# Patient Record
Sex: FEMALE | Race: ASIAN | NOT HISPANIC OR LATINO | URBAN - METROPOLITAN AREA
[De-identification: names, ages, dates, MRNs, and addresses within clinical notes are randomized per-mention and may not be internally consistent; named-entity substitution may affect disease eponyms.]

---

## 2020-07-17 ENCOUNTER — OFFICE VISIT (OUTPATIENT)
Dept: URBAN - METROPOLITAN AREA CLINIC 115 | Facility: CLINIC | Age: 65
End: 2020-07-17
Payer: MEDICARE

## 2020-07-17 DIAGNOSIS — F41.9 ANXIETY: ICD-10-CM

## 2020-07-17 DIAGNOSIS — K21.9 GERD: ICD-10-CM

## 2020-07-17 DIAGNOSIS — K29.70 GASTRITIS: ICD-10-CM

## 2020-07-17 DIAGNOSIS — K58.9 IBS (IRRITABLE BOWEL SYNDROME)-DIARRHEA: ICD-10-CM

## 2020-07-17 PROBLEM — 4556007 GASTRITIS: Status: ACTIVE | Noted: 2020-07-17

## 2020-07-17 PROBLEM — 235595009 GASTROESOPHAGEAL REFLUX DISEASE: Status: ACTIVE | Noted: 2020-07-17

## 2020-07-17 PROBLEM — 48694002 ANXIETY: Status: ACTIVE | Noted: 2020-07-17

## 2020-07-17 PROCEDURE — G8420 CALC BMI NORM PARAMETERS: HCPCS | Performed by: INTERNAL MEDICINE

## 2020-07-17 PROCEDURE — 3017F COLORECTAL CA SCREEN DOC REV: CPT | Performed by: INTERNAL MEDICINE

## 2020-07-17 PROCEDURE — G9903 PT SCRN TBCO ID AS NON USER: HCPCS | Performed by: INTERNAL MEDICINE

## 2020-07-17 PROCEDURE — G8427 DOCREV CUR MEDS BY ELIG CLIN: HCPCS | Performed by: INTERNAL MEDICINE

## 2020-07-17 PROCEDURE — 99214 OFFICE O/P EST MOD 30 MIN: CPT | Performed by: INTERNAL MEDICINE

## 2020-07-17 RX ORDER — OMEPRAZOLE 40 MG/1
1 CAPSULE 30 MINUTES BEFORE MORNING MEAL CAPSULE, DELAYED RELEASE PELLETS ORAL ONCE A DAY
Qty: 90 CAPSULE | Refills: 2 | OUTPATIENT
Start: 2020-07-17

## 2020-07-17 RX ORDER — CLONAZEPAM 0.5 MG/1
TAKE 1 TABLET (0.5 MG) BY ORAL ROUTE 2 TIMES PER DAY TABLET ORAL 2
Qty: 0 | Refills: 0 | Status: ACTIVE | COMMUNITY
Start: 1900-01-01

## 2020-07-17 RX ORDER — DICYCLOMINE HYDROCHLORIDE 10 MG/1
TAKE 1 CAPSULE (10 MG) BY ORAL ROUTE 3 TIMES PER DAY CAPSULE ORAL
Qty: 0 | Refills: 0 | Status: ACTIVE | COMMUNITY
Start: 1900-01-01

## 2020-07-17 RX ORDER — ESOMEPRAZOLE MAGNESIUM 20 MG
TAKE 1 CAPSULE (20 MG) ;SPRINKLE ENTIRE CONTENTS ON SMALL AMOUNT APPLESAUCE; TAKE IMMEDIATELY BY ORAL ROUTE ONCE DAILY AT LEAST 1 HOUR BEFORE A MEAL SWALLOWING WHOLE. DO NOT CRUSH OR CHEW GRANULES CAPSULE,DELAYED RELEASE (ENTERIC COATED) ORAL 1
Qty: 0 | Refills: 0 | Status: ON HOLD | COMMUNITY
Start: 1900-01-01

## 2020-07-17 RX ORDER — PREGABALIN 150 MG
TAKE 1 CAPSULE (150 MG) BY ORAL ROUTE 2 TIMES PER DAY CAPSULE ORAL 2
Qty: 0 | Refills: 0 | Status: ACTIVE | COMMUNITY
Start: 1900-01-01

## 2020-07-17 RX ORDER — ESOMEPRAZOLE MAGNESIUM 20 MG
TAKE 1 CAPSULE (20 MG) ;SPRINKLE ENTIRE CONTENTS ON SMALL AMOUNT APPLESAUCE; TAKE IMMEDIATELY BY ORAL ROUTE ONCE DAILY AT LEAST 1 HOUR BEFORE A MEAL SWALLOWING WHOLE. DO NOT CRUSH OR CHEW GRANULES CAPSULE,DELAYED RELEASE (ENTERIC COATED) ORAL 1
OUTPATIENT
Start: 1900-01-01

## 2020-07-17 RX ORDER — AMITRIPTYLINE HYDROCHLORIDE 10 MG/1
1 TABLET AT BEDTIME TABLET, FILM COATED ORAL ONCE A DAY
Qty: 30 TABLET | Refills: 3 | OUTPATIENT
Start: 2020-07-17

## 2020-07-17 RX ORDER — SUCRALFATE 1 G/1
TAKE ONE TABLET BY MOUTH TWICE A DAY TABLET ORAL
Qty: 60 | Refills: 1 | Status: ON HOLD | COMMUNITY
Start: 2020-04-03

## 2020-07-17 RX ORDER — DICYCLOMINE HYDROCHLORIDE 10 MG/1
TAKE 1 CAPSULE (10 MG) BY ORAL ROUTE 3 TIMES PER DAY CAPSULE ORAL
Qty: 0 | Refills: 0
Start: 1900-01-01

## 2020-07-17 RX ORDER — PANTOPRAZOLE SODIUM 40 MG/1
TAKE ONE TABLET BY MOUTH ONE TIME DAILY TABLET, DELAYED RELEASE ORAL
Qty: 90 | Refills: 0 | Status: DISCONTINUED | COMMUNITY
Start: 2020-03-30

## 2020-07-17 RX ORDER — METRONIDAZOLE 500 MG/1
TAKE 2 TABLETS (1 GRAM) BY ORAL ROUTE 2 TIMES PER DAY TABLET, FILM COATED ORAL 2
Qty: 0 | Refills: 0 | Status: ACTIVE | COMMUNITY
Start: 1900-01-01

## 2020-07-17 RX ORDER — METRONIDAZOLE 500 MG/1
1 TABLET TABLET, FILM COATED ORAL THREE TIMES A DAY
Qty: 30 TABLET | Refills: 0 | OUTPATIENT
Start: 2020-07-17 | End: 2020-07-27

## 2020-07-17 RX ORDER — TIZANIDINE HYDROCHLORIDE 4 MG/1
TAKE 1 CAPSULE (4 MG) BY ORAL ROUTE 3 TIMES PER DAY CAPSULE ORAL
Qty: 0 | Refills: 0 | Status: ACTIVE | COMMUNITY
Start: 1900-01-01

## 2020-07-17 NOTE — HPI-TODAY'S VISIT:
Ms. Grubbs is 64-year-old female who was seen last in December 2019 came in today for the complaints of having abdominal pain nausea diarrhea.  She is accompanied by her  who stated that her symptoms gets worse when she is nervous and anxious.  She has had a extensive work-up in the past from [place] she was diagnosed with IBS and duodenitis.  Celiac panel was negative upper endoscopy showed erosive gastritis.  She was started on Carafate and pantoprazole.  Patient stated pantoprazole dose did not help her however omeprazole was more much more helpful.  Patient also admits when she takes Diarrhea she gets more bloating and discomfort.  Bowel movements are anywhere between 2 to 3/day denies any watery stools.  She denies any rectal bleeding or unintentional weight loss.  She admits for lack of sleep and anxiety no recent weight loss

## 2020-07-21 ENCOUNTER — TELEPHONE ENCOUNTER (OUTPATIENT)
Dept: URBAN - METROPOLITAN AREA CLINIC 82 | Facility: CLINIC | Age: 65
End: 2020-07-21

## 2020-12-14 ENCOUNTER — TELEPHONE ENCOUNTER (OUTPATIENT)
Dept: URBAN - METROPOLITAN AREA CLINIC 114 | Facility: CLINIC | Age: 65
End: 2020-12-14

## 2020-12-14 RX ORDER — AMITRIPTYLINE HYDROCHLORIDE 10 MG/1
1 TABLET AT BEDTIME TABLET, FILM COATED ORAL ONCE A DAY
Qty: 30 TABLET | Refills: 3
Start: 2020-07-17

## 2021-03-24 ENCOUNTER — OFFICE VISIT (OUTPATIENT)
Dept: URBAN - METROPOLITAN AREA CLINIC 115 | Facility: CLINIC | Age: 66
End: 2021-03-24

## 2021-04-15 ENCOUNTER — TELEPHONE ENCOUNTER (OUTPATIENT)
Dept: URBAN - METROPOLITAN AREA CLINIC 115 | Facility: CLINIC | Age: 66
End: 2021-04-15

## 2021-04-15 RX ORDER — POLYETHYLENE GLYCOL 3350 17 G/17G
AS DIRECTED POWDER, FOR SOLUTION ORAL
Qty: 238 GRAM | Refills: 0 | OUTPATIENT
Start: 2021-04-20 | End: 2021-04-21

## 2021-04-20 ENCOUNTER — LAB OUTSIDE AN ENCOUNTER (OUTPATIENT)
Dept: URBAN - METROPOLITAN AREA CLINIC 115 | Facility: CLINIC | Age: 66
End: 2021-04-20

## 2021-06-03 ENCOUNTER — OFFICE VISIT (OUTPATIENT)
Dept: URBAN - METROPOLITAN AREA SURGERY CENTER 13 | Facility: SURGERY CENTER | Age: 66
End: 2021-06-03
Payer: MEDICARE

## 2021-06-03 ENCOUNTER — TELEPHONE ENCOUNTER (OUTPATIENT)
Dept: URBAN - METROPOLITAN AREA CLINIC 92 | Facility: CLINIC | Age: 66
End: 2021-06-03

## 2021-06-03 ENCOUNTER — CLAIMS CREATED FROM THE CLAIM WINDOW (OUTPATIENT)
Dept: URBAN - METROPOLITAN AREA CLINIC 4 | Facility: CLINIC | Age: 66
End: 2021-06-03
Payer: MEDICARE

## 2021-06-03 DIAGNOSIS — R10.13 ABDOMINAL DISCOMFORT, EPIGASTRIC: ICD-10-CM

## 2021-06-03 DIAGNOSIS — K63.89 JEJUNAL POLYP: ICD-10-CM

## 2021-06-03 DIAGNOSIS — K31.89 ACQUIRED DEFORMITY OF DUODENUM: ICD-10-CM

## 2021-06-03 DIAGNOSIS — R19.7 ACUTE DIARRHEA: ICD-10-CM

## 2021-06-03 DIAGNOSIS — K31.7 BENIGN GASTRIC POLYP: ICD-10-CM

## 2021-06-03 DIAGNOSIS — K31.7 POLYP OF STOMACH AND DUODENUM: ICD-10-CM

## 2021-06-03 DIAGNOSIS — K22.8 COLUMNAR-LINED ESOPHAGUS: ICD-10-CM

## 2021-06-03 DIAGNOSIS — K31.89 SMALL STOMACH SYNDROME: ICD-10-CM

## 2021-06-03 PROCEDURE — 88305 TISSUE EXAM BY PATHOLOGIST: CPT | Performed by: PATHOLOGY

## 2021-06-03 PROCEDURE — G8907 PT DOC NO EVENTS ON DISCHARG: HCPCS | Performed by: INTERNAL MEDICINE

## 2021-06-03 PROCEDURE — 43239 EGD BIOPSY SINGLE/MULTIPLE: CPT | Performed by: INTERNAL MEDICINE

## 2021-06-03 PROCEDURE — 45380 COLONOSCOPY AND BIOPSY: CPT | Performed by: INTERNAL MEDICINE

## 2021-06-03 PROCEDURE — 88312 SPECIAL STAINS GROUP 1: CPT | Performed by: PATHOLOGY

## 2021-06-03 RX ORDER — DICYCLOMINE HYDROCHLORIDE 10 MG/1
TAKE 1 CAPSULE (10 MG) BY ORAL ROUTE 3 TIMES PER DAY CAPSULE ORAL
Qty: 0 | Refills: 0 | Status: ACTIVE | COMMUNITY
Start: 1900-01-01

## 2021-06-03 RX ORDER — PREGABALIN 150 MG
TAKE 1 CAPSULE (150 MG) BY ORAL ROUTE 2 TIMES PER DAY CAPSULE ORAL 2
Qty: 0 | Refills: 0 | Status: ACTIVE | COMMUNITY
Start: 1900-01-01

## 2021-06-03 RX ORDER — SUCRALFATE 1 G/1
TAKE ONE TABLET BY MOUTH TWICE A DAY TABLET ORAL
Qty: 60 | Refills: 1 | Status: ON HOLD | COMMUNITY
Start: 2020-04-03

## 2021-06-03 RX ORDER — AMITRIPTYLINE HYDROCHLORIDE 10 MG/1
1 TABLET AT BEDTIME TABLET, FILM COATED ORAL ONCE A DAY
Qty: 90 | Refills: 0
Start: 2020-07-17

## 2021-06-03 RX ORDER — OMEPRAZOLE 40 MG/1
1 CAPSULE 30 MINUTES BEFORE MORNING MEAL CAPSULE, DELAYED RELEASE PELLETS ORAL ONCE A DAY
Qty: 90 CAPSULE | Refills: 2
Start: 2020-07-17

## 2021-06-03 RX ORDER — OMEPRAZOLE 40 MG/1
1 CAPSULE 30 MINUTES BEFORE MORNING MEAL CAPSULE, DELAYED RELEASE PELLETS ORAL ONCE A DAY
Qty: 90 CAPSULE | Refills: 2 | Status: ACTIVE | COMMUNITY
Start: 2020-07-17

## 2021-06-03 RX ORDER — TIZANIDINE HYDROCHLORIDE 4 MG/1
TAKE 1 CAPSULE (4 MG) BY ORAL ROUTE 3 TIMES PER DAY CAPSULE ORAL
Qty: 0 | Refills: 0 | Status: ACTIVE | COMMUNITY
Start: 1900-01-01

## 2021-06-03 RX ORDER — CLONAZEPAM 0.5 MG/1
TAKE 1 TABLET (0.5 MG) BY ORAL ROUTE 2 TIMES PER DAY TABLET ORAL 2
Qty: 0 | Refills: 0 | Status: ACTIVE | COMMUNITY
Start: 1900-01-01

## 2021-06-03 RX ORDER — AMITRIPTYLINE HYDROCHLORIDE 10 MG/1
1 TABLET AT BEDTIME TABLET, FILM COATED ORAL ONCE A DAY
Qty: 30 TABLET | Refills: 3 | Status: ACTIVE | COMMUNITY
Start: 2020-07-17

## 2021-06-03 RX ORDER — METRONIDAZOLE 500 MG/1
TAKE 2 TABLETS (1 GRAM) BY ORAL ROUTE 2 TIMES PER DAY TABLET, FILM COATED ORAL 2
Qty: 0 | Refills: 0 | Status: ACTIVE | COMMUNITY
Start: 1900-01-01

## 2021-12-06 ENCOUNTER — ERX REFILL RESPONSE (OUTPATIENT)
Dept: URBAN - METROPOLITAN AREA CLINIC 82 | Facility: CLINIC | Age: 66
End: 2021-12-06

## 2021-12-06 RX ORDER — AMITRIPTYLINE HYDROCHLORIDE 10 MG/1
TAKE ONE TABLET BY MOUTH ONE TIME DAILY AT BEDTIME TABLET, FILM COATED ORAL
Qty: 30 TABLET | Refills: 4 | OUTPATIENT

## 2023-11-30 ENCOUNTER — OFFICE VISIT (OUTPATIENT)
Dept: URBAN - METROPOLITAN AREA CLINIC 82 | Facility: CLINIC | Age: 68
End: 2023-11-30

## 2023-11-30 ENCOUNTER — DASHBOARD ENCOUNTERS (OUTPATIENT)
Age: 68
End: 2023-11-30

## 2023-11-30 RX ORDER — TIZANIDINE HYDROCHLORIDE 4 MG/1
TAKE 1 CAPSULE (4 MG) BY ORAL ROUTE 3 TIMES PER DAY CAPSULE ORAL
Qty: 0 | Refills: 0 | Status: ACTIVE | COMMUNITY
Start: 1900-01-01

## 2023-11-30 RX ORDER — DICYCLOMINE HYDROCHLORIDE 10 MG/1
TAKE 1 CAPSULE (10 MG) BY ORAL ROUTE 3 TIMES PER DAY CAPSULE ORAL
Qty: 0 | Refills: 0 | Status: ACTIVE | COMMUNITY
Start: 1900-01-01

## 2023-11-30 RX ORDER — METRONIDAZOLE 500 MG/1
TAKE 2 TABLETS (1 GRAM) BY ORAL ROUTE 2 TIMES PER DAY TABLET, FILM COATED ORAL 2
Qty: 0 | Refills: 0 | Status: ACTIVE | COMMUNITY
Start: 1900-01-01

## 2023-11-30 RX ORDER — PREGABALIN 150 MG
TAKE 1 CAPSULE (150 MG) BY ORAL ROUTE 2 TIMES PER DAY CAPSULE ORAL 2
Qty: 0 | Refills: 0 | Status: ACTIVE | COMMUNITY
Start: 1900-01-01

## 2023-11-30 RX ORDER — SUCRALFATE 1 G/1
TAKE ONE TABLET BY MOUTH TWICE A DAY TABLET ORAL
Qty: 60 | Refills: 1 | Status: ON HOLD | COMMUNITY
Start: 2020-04-03

## 2023-11-30 RX ORDER — AMITRIPTYLINE HYDROCHLORIDE 10 MG/1
TAKE ONE TABLET BY MOUTH ONE TIME DAILY AT BEDTIME TABLET, FILM COATED ORAL
Qty: 30 TABLET | Refills: 4 | Status: ACTIVE | COMMUNITY

## 2023-11-30 RX ORDER — OMEPRAZOLE 40 MG/1
1 CAPSULE 30 MINUTES BEFORE MORNING MEAL CAPSULE, DELAYED RELEASE PELLETS ORAL ONCE A DAY
Qty: 90 CAPSULE | Refills: 2 | Status: ACTIVE | COMMUNITY
Start: 2020-07-17

## 2023-11-30 RX ORDER — CLONAZEPAM 0.5 MG/1
TAKE 1 TABLET (0.5 MG) BY ORAL ROUTE 2 TIMES PER DAY TABLET ORAL 2
Qty: 0 | Refills: 0 | Status: ACTIVE | COMMUNITY
Start: 1900-01-01

## 2024-06-10 ENCOUNTER — OFFICE VISIT (OUTPATIENT)
Dept: URBAN - METROPOLITAN AREA CLINIC 82 | Facility: CLINIC | Age: 69
End: 2024-06-10

## 2025-05-23 ENCOUNTER — OFFICE VISIT (OUTPATIENT)
Dept: URBAN - METROPOLITAN AREA CLINIC 115 | Facility: CLINIC | Age: 70
End: 2025-05-23
Payer: MEDICARE

## 2025-05-23 DIAGNOSIS — R10.13 DYSPEPSIA: ICD-10-CM

## 2025-05-23 DIAGNOSIS — R19.5 LOOSE STOOLS: ICD-10-CM

## 2025-05-23 PROBLEM — 162031009: Status: ACTIVE | Noted: 2025-05-23

## 2025-05-23 PROCEDURE — 99204 OFFICE O/P NEW MOD 45 MIN: CPT | Performed by: INTERNAL MEDICINE

## 2025-05-23 RX ORDER — SODIUM, POTASSIUM,MAG SULFATES 17.5-3.13G
177 ML X 2 SOLUTION, RECONSTITUTED, ORAL ORAL
Qty: 1 KIT | Refills: 0 | OUTPATIENT
Start: 2025-05-23 | End: 2025-05-24

## 2025-05-23 RX ORDER — PREGABALIN 150 MG
TAKE 1 CAPSULE (150 MG) BY ORAL ROUTE 2 TIMES PER DAY CAPSULE ORAL 2
Qty: 0 | Refills: 0 | Status: ACTIVE | COMMUNITY
Start: 1900-01-01

## 2025-05-23 RX ORDER — DICYCLOMINE HYDROCHLORIDE 10 MG/1
TAKE 1 CAPSULE (10 MG) BY ORAL ROUTE 3 TIMES PER DAY CAPSULE ORAL
Qty: 0 | Refills: 0 | Status: ACTIVE | COMMUNITY
Start: 1900-01-01

## 2025-05-23 RX ORDER — OMEPRAZOLE 40 MG/1
1 CAPSULE 30 MINUTES BEFORE MORNING MEAL CAPSULE, DELAYED RELEASE PELLETS ORAL ONCE A DAY
Qty: 90 CAPSULE | Refills: 2 | Status: ACTIVE | COMMUNITY
Start: 2020-07-17

## 2025-05-23 RX ORDER — CLONAZEPAM 0.5 MG/1
TAKE 1 TABLET (0.5 MG) BY ORAL ROUTE 2 TIMES PER DAY TABLET ORAL 2
Qty: 0 | Refills: 0 | Status: ACTIVE | COMMUNITY
Start: 1900-01-01

## 2025-05-23 RX ORDER — CHOLESTYRAMINE 4 G/9G
1 PACKET MIXED WITH WATER OR NON-CARBONATED DRINK POWDER, FOR SUSPENSION ORAL ONCE A DAY
Qty: 30 PACKET | Refills: 3 | OUTPATIENT
Start: 2025-05-23

## 2025-05-23 RX ORDER — TIZANIDINE HYDROCHLORIDE 4 MG/1
TAKE 1 CAPSULE (4 MG) BY ORAL ROUTE 3 TIMES PER DAY CAPSULE ORAL
Qty: 0 | Refills: 0 | Status: ACTIVE | COMMUNITY
Start: 1900-01-01

## 2025-05-23 RX ORDER — SUCRALFATE 1 G/1
TAKE ONE TABLET BY MOUTH TWICE A DAY TABLET ORAL
Qty: 60 | Refills: 1 | Status: ON HOLD | COMMUNITY
Start: 2020-04-03

## 2025-05-23 RX ORDER — AMITRIPTYLINE HYDROCHLORIDE 10 MG/1
TAKE ONE TABLET BY MOUTH ONE TIME DAILY AT BEDTIME TABLET, FILM COATED ORAL
Qty: 30 TABLET | Refills: 4 | Status: ACTIVE | COMMUNITY

## 2025-05-23 RX ORDER — METRONIDAZOLE 500 MG/1
TAKE 2 TABLETS (1 GRAM) BY ORAL ROUTE 2 TIMES PER DAY TABLET, FILM COATED ORAL 2
Qty: 0 | Refills: 0 | Status: ACTIVE | COMMUNITY
Start: 1900-01-01

## 2025-05-23 NOTE — HPI-TODAY'S VISIT:
Ms. Grubbs is 64-year-old female who was seen last in December 2019 came in today for the complaints of having abdominal pain nausea diarrhea.  She is accompanied by her  who stated that her symptoms gets worse when she is nervous and anxious.  She has had a extensive work-up in the past from [place] she was diagnosed with IBS and duodenitis.  Celiac panel was negative upper endoscopy showed erosive gastritis.  She was started on Carafate and pantoprazole.  Patient stated pantoprazole dose did not help her however omeprazole was more much more helpful.  Patient also admits when she takes Diarrhea she gets more bloating and discomfort.  Bowel movements are anywhere between 2 to 3/day denies any watery stools.  She denies any rectal bleeding or unintentional weight loss.  She admits for lack of sleep and anxiety no recent weight loss 69-year-old female patient was seen today for complaints of having episodes of epigastric abdominal discomfort nausea as well as postprandial pain and diarrhea and loose stools.  Patient stated she has had the symptoms going for several years however recently there was a flareup of her symptoms with the frequent symptoms of dizziness prominent pain and cramping prior to having a bowel movement her prior PCP has given her prescription for the dicyclomine to take it 2-3 times a day when she is taking it with some partial relief.  Patient is admits for having stress and anxiety.  Patient also reports having abdominal cramping and nausea and headache and dizzy spells which improves after having bowel movement and he takes several times several hours to improve her GI dizzy spells.  Patient admits for having symptoms with spicy food and greasy food and less with low-fat diet and patient was concerned about leaving black bland eating life.  Today she is accompanied by her daughter today.  Prior procedures were reviewed.  Patient admits for stress and anxiety which triggered some of her symptoms.  Patient reports she is taking amitriptyline at nighttime and she does not recall if she with the dose of the medication.  She also has had history of cholecystectomy in the past and ever since she has been having more loose stools prior workup in the past was all reviewed.  She admits for taking NSAIDs often.

## 2025-05-27 LAB
ALMOND (F20) IGE: <0.1
BRAZIL NUT (F18) IGE: <0.1
CASHEW NUT (F202) IGE: <0.1
CLASS: 0
CODFISH (F3) IGE: <0.1
COW'S MILK (F2) IGE: <0.1
EGG WHITE (F1) IGE: <0.1
HAZELNUT (F17) IGE: <0.1
IMMUNOGLOBULIN A: 227
INTERPRETATION: (no result)
INTERPRETATION: (no result)
MACADAMIA NUT (RF345) IGE **: <0.1
PEANUT (F13) IGE: <0.1
SALMON (F41) IGE: <0.1
SCALLOP (F338) IGE: <0.1
SESAME SEED (F10) IGE: <0.1
SHRIMP (F24) IGE: <0.1
SOYBEAN (F14) IGE: <0.1
TISSUE TRANSGLUTAMINASE AB, IGA: <1
TUNA (F40) IGE: <0.1
WALNUT (F256) IGE: <0.1
WHEAT (F4) IGE: <0.1

## 2025-05-30 ENCOUNTER — TELEPHONE ENCOUNTER (OUTPATIENT)
Dept: URBAN - METROPOLITAN AREA CLINIC 85 | Facility: CLINIC | Age: 70
End: 2025-05-30

## 2025-06-12 ENCOUNTER — CLAIMS CREATED FROM THE CLAIM WINDOW (OUTPATIENT)
Dept: URBAN - METROPOLITAN AREA CLINIC 4 | Facility: CLINIC | Age: 70
End: 2025-06-12
Payer: MEDICARE

## 2025-06-12 ENCOUNTER — CLAIMS CREATED FROM THE CLAIM WINDOW (OUTPATIENT)
Dept: URBAN - METROPOLITAN AREA SURGERY CENTER 13 | Facility: SURGERY CENTER | Age: 70
End: 2025-06-12
Payer: MEDICARE

## 2025-06-12 DIAGNOSIS — D12.3 ADENOMA OF TRANSVERSE COLON: ICD-10-CM

## 2025-06-12 DIAGNOSIS — K31.89 OTHER DISEASES OF STOMACH AND DUODENUM: ICD-10-CM

## 2025-06-12 DIAGNOSIS — K21.9 GASTRO-ESOPHAGEAL REFLUX DISEASE WITHOUT ESOPHAGITIS: ICD-10-CM

## 2025-06-12 DIAGNOSIS — R19.4 ALTERATION IN BOWEL ELIMINATION: ICD-10-CM

## 2025-06-12 DIAGNOSIS — K63.5 COLONIC POLYPS: ICD-10-CM

## 2025-06-12 DIAGNOSIS — K21.9 ACID REFLUX: ICD-10-CM

## 2025-06-12 DIAGNOSIS — K63.89 OTHER SPECIFIED DISEASES OF INTESTINE: ICD-10-CM

## 2025-06-12 DIAGNOSIS — D12.3 BENIGN NEOPLASM OF TRANSVERSE COLON: ICD-10-CM

## 2025-06-12 PROCEDURE — 43239 EGD BIOPSY SINGLE/MULTIPLE: CPT | Performed by: INTERNAL MEDICINE

## 2025-06-12 PROCEDURE — 45385 COLONOSCOPY W/LESION REMOVAL: CPT | Performed by: INTERNAL MEDICINE

## 2025-06-12 PROCEDURE — 88305 TISSUE EXAM BY PATHOLOGIST: CPT | Performed by: PATHOLOGY

## 2025-06-12 PROCEDURE — 45380 COLONOSCOPY AND BIOPSY: CPT | Performed by: INTERNAL MEDICINE

## 2025-06-12 PROCEDURE — 00813 ANES UPR LWR GI NDSC PX: CPT | Performed by: ANESTHESIOLOGY

## 2025-06-12 PROCEDURE — 88312 SPECIAL STAINS GROUP 1: CPT | Performed by: PATHOLOGY

## 2025-06-12 PROCEDURE — 00813 ANES UPR LWR GI NDSC PX: CPT | Performed by: ANESTHESIOLOGIST ASSISTANT

## 2025-06-12 PROCEDURE — 88313 SPECIAL STAINS GROUP 2: CPT | Performed by: PATHOLOGY

## 2025-06-12 PROCEDURE — 88342 IMHCHEM/IMCYTCHM 1ST ANTB: CPT | Performed by: PATHOLOGY
